# Patient Record
Sex: FEMALE | Race: WHITE | URBAN - METROPOLITAN AREA
[De-identification: names, ages, dates, MRNs, and addresses within clinical notes are randomized per-mention and may not be internally consistent; named-entity substitution may affect disease eponyms.]

---

## 2017-12-13 ENCOUNTER — TELEPHONE (OUTPATIENT)
Dept: TRANSPLANT | Facility: CLINIC | Age: 52
End: 2017-12-13

## 2017-12-13 NOTE — TELEPHONE ENCOUNTER
Yeimylane Ornelas called and stated that she is interested in becoming a living donor for her nephew, Bienvenido Cabrera.  Medical and social history obtained.  Patient reports her BMI is 33.1. Required weight loss discussed. No absolute contraindications noted.  All questions answered.  HLA kit requested.

## 2017-12-27 ENCOUNTER — TELEPHONE (OUTPATIENT)
Dept: TRANSPLANT | Facility: CLINIC | Age: 52
End: 2017-12-27

## 2017-12-27 DIAGNOSIS — Z00.5 TRANSPLANT DONOR EVALUATION: Primary | ICD-10-CM

## 2017-12-28 ENCOUNTER — LAB VISIT (OUTPATIENT)
Dept: LAB | Facility: HOSPITAL | Age: 52
End: 2017-12-28
Payer: COMMERCIAL

## 2017-12-28 DIAGNOSIS — Z00.5 TRANSPLANT DONOR EVALUATION: ICD-10-CM

## 2017-12-28 PROCEDURE — 81373 HLA I TYPING 1 LOCUS LR: CPT | Mod: 91,PO,TXP

## 2017-12-28 PROCEDURE — 81376 HLA II TYPING 1 LOCUS LR: CPT | Mod: 91,PO,TXP

## 2017-12-28 PROCEDURE — 86901 BLOOD TYPING SEROLOGIC RH(D): CPT | Mod: TXP

## 2017-12-28 PROCEDURE — 36415 COLL VENOUS BLD VENIPUNCTURE: CPT | Mod: TXP

## 2017-12-28 PROCEDURE — 81373 HLA I TYPING 1 LOCUS LR: CPT | Mod: PO,TXP

## 2017-12-28 PROCEDURE — 81376 HLA II TYPING 1 LOCUS LR: CPT | Mod: PO,TXP

## 2017-12-28 PROCEDURE — 81380 HLA I TYPING 1 LOCUS HR: CPT | Mod: PO,TXP

## 2017-12-29 LAB — ABO + RH BLD: NORMAL

## 2018-01-05 LAB — HLATY INTERPRETATION: NORMAL

## 2018-01-11 ENCOUNTER — TELEPHONE (OUTPATIENT)
Dept: TRANSPLANT | Facility: CLINIC | Age: 53
End: 2018-01-11

## 2018-01-11 DIAGNOSIS — Z00.5 TRANSPLANT DONOR EVALUATION: Primary | ICD-10-CM

## 2018-01-11 NOTE — TELEPHONE ENCOUNTER
Patient notified that the results of the crossmatch with her nephew shows that they are compatible. States she would like to proceed with the medical evaluation. Required testing discussed and information provided to schedule testing.    Patient will have an updated gyn exam and mammogram scheduled at home. She will schedule a colonoscopy locally if approved as a donor.   All questions were answered and patient verbalized understanding.

## 2018-01-16 LAB
HLA DRB4 1: NORMAL
HLA SSO DNA TYPING CLASS I & II INTERPRETATION: NORMAL
HLA-A 1 SERO. EQUIV: 29
HLA-A 1: NORMAL
HLA-A 2 SERO. EQUIV: 32
HLA-A 2: NORMAL
HLA-B 1 SERO. EQUIV: 35
HLA-B 1: NORMAL
HLA-B 2 SERO. EQUIV: 44
HLA-B 2: NORMAL
HLA-BW 1 SERO. EQUIV: 4
HLA-BW 2 SERO. EQUIV: 6
HLA-C 1: NORMAL
HLA-C 2: NORMAL
HLA-C1 HI RES: NORMAL
HLA-C2 HI RES: NORMAL
HLA-CW 1 SERO. EQUIV: 4
HLA-CW 2 SERO. EQUIV: 16
HLA-DQ 1 SERO. EQUIV: 7
HLA-DQ 2 SERO. EQUIV: 5
HLA-DQB1 1: NORMAL
HLA-DQB1 2: NORMAL
HLA-DRB1 1 SERO. EQUIV: 1
HLA-DRB1 1: NORMAL
HLA-DRB1 2 SERO. EQUIV: 13
HLA-DRB1 2: NORMAL
HLA-DRB3 1: NORMAL
HLA-DRB3 2: NORMAL
HLA-DRB345 1 SERO. EQUIV: 52
HLA-DRB345 2 SERO. EQUIV: NORMAL
HLA-DRB4 2: NORMAL
HLA-DRB5 1: NORMAL
HLA-DRB5 2: NORMAL
HRC TESTING DATE: NORMAL
Lab: NORMAL
SSDQB TESTING DATE: NORMAL
SSDRB TESTING DATE: NORMAL
SSOA TESTING DATE: NORMAL
SSOB TESTING DATE: NORMAL
SSOC TESTING DATE: NORMAL
SSODR TESTING DATE: NORMAL
TYSSO TESTING DATE: NORMAL

## 2018-03-01 ENCOUNTER — HOSPITAL ENCOUNTER (OUTPATIENT)
Dept: RADIOLOGY | Facility: HOSPITAL | Age: 53
Discharge: HOME | End: 2018-03-01
Attending: NURSE PRACTITIONER
Payer: COMMERCIAL

## 2018-03-01 ENCOUNTER — OFFICE VISIT (OUTPATIENT)
Dept: TRANSPLANT | Facility: CLINIC | Age: 53
End: 2018-03-01
Payer: COMMERCIAL

## 2018-03-01 VITALS
DIASTOLIC BLOOD PRESSURE: 86 MMHG | RESPIRATION RATE: 20 BRPM | BODY MASS INDEX: 33.71 KG/M2 | OXYGEN SATURATION: 98 % | HEIGHT: 63 IN | WEIGHT: 190.25 LBS | HEART RATE: 109 BPM | TEMPERATURE: 98 F | SYSTOLIC BLOOD PRESSURE: 130 MMHG

## 2018-03-01 DIAGNOSIS — M54.40 CHRONIC LOW BACK PAIN WITH SCIATICA, SCIATICA LATERALITY UNSPECIFIED, UNSPECIFIED BACK PAIN LATERALITY: ICD-10-CM

## 2018-03-01 DIAGNOSIS — Z00.5 TRANSPLANT DONOR EVALUATION: ICD-10-CM

## 2018-03-01 DIAGNOSIS — F32.A DEPRESSION, UNSPECIFIED DEPRESSION TYPE: ICD-10-CM

## 2018-03-01 DIAGNOSIS — Z00.5 TRANSPLANT DONOR EVALUATION: Primary | ICD-10-CM

## 2018-03-01 DIAGNOSIS — Z00.5 WILLING TO BE KIDNEY DONOR: ICD-10-CM

## 2018-03-01 DIAGNOSIS — G89.29 CHRONIC LOW BACK PAIN WITH SCIATICA, SCIATICA LATERALITY UNSPECIFIED, UNSPECIFIED BACK PAIN LATERALITY: ICD-10-CM

## 2018-03-01 DIAGNOSIS — E66.9 OBESITY (BMI 30.0-34.9): ICD-10-CM

## 2018-03-01 PROCEDURE — 78707 K FLOW/FUNCT IMAGE W/O DRUG: CPT | Mod: 26,TXP,, | Performed by: RADIOLOGY

## 2018-03-01 PROCEDURE — A9562 TC99M MERTIATIDE: HCPCS | Mod: TXP

## 2018-03-01 PROCEDURE — 99204 OFFICE O/P NEW MOD 45 MIN: CPT | Mod: S$GLB,TXP,, | Performed by: TRANSPLANT SURGERY

## 2018-03-01 PROCEDURE — 71046 X-RAY EXAM CHEST 2 VIEWS: CPT | Mod: 26,TXP,, | Performed by: RADIOLOGY

## 2018-03-01 PROCEDURE — 71046 X-RAY EXAM CHEST 2 VIEWS: CPT | Mod: TC,FY,TXP

## 2018-03-01 PROCEDURE — 99999 PR PBB SHADOW E&M-EST. PATIENT-LVL IV: CPT | Mod: PBBFAC,TXP,, | Performed by: INTERNAL MEDICINE

## 2018-03-01 PROCEDURE — 99205 OFFICE O/P NEW HI 60 MIN: CPT | Mod: S$GLB,TXP,, | Performed by: INTERNAL MEDICINE

## 2018-03-01 PROCEDURE — 97802 MEDICAL NUTRITION INDIV IN: CPT | Mod: S$GLB,TXP,, | Performed by: DIETITIAN, REGISTERED

## 2018-03-01 RX ORDER — HYDROCODONE BITARTRATE AND ACETAMINOPHEN 10; 325 MG/1; MG/1
1 TABLET ORAL EVERY 4 HOURS PRN
COMMUNITY

## 2018-03-01 RX ORDER — TEMAZEPAM 30 MG/1
30 CAPSULE ORAL NIGHTLY PRN
COMMUNITY

## 2018-03-01 RX ORDER — AMITRIPTYLINE HYDROCHLORIDE 100 MG/1
100 TABLET ORAL NIGHTLY
COMMUNITY

## 2018-03-01 RX ORDER — GABAPENTIN 300 MG/1
300 CAPSULE ORAL NIGHTLY
COMMUNITY

## 2018-03-01 RX ORDER — CYCLOBENZAPRINE HCL 10 MG
10 TABLET ORAL NIGHTLY
COMMUNITY

## 2018-03-01 NOTE — PROGRESS NOTES
Transplant Surgery Kidney Donor Evaluation     Referring Physician:     Subjective:     Chief Complaint: Linh Ornelas is a 52 y.o. year old female who presents today wishing to be evaluated as a potential living related donor for nephew.    History of Present Illness:  Linh reports being here without coercion, payment, guilt, or other alternative motives other than wanting to help someone with kidney disease.    Review of Systems   Constitutional: Negative for activity change, appetite change, chills and fever.   Respiratory: Negative for cough and shortness of breath.    Gastrointestinal: Negative for abdominal distention, constipation, diarrhea, nausea and vomiting.   Genitourinary: Negative for difficulty urinating and dyspareunia.   Musculoskeletal: Negative.    Skin: Negative for color change and rash.   Allergic/Immunologic: Negative for immunocompromised state.   Neurological: Negative for dizziness and headaches.   Psychiatric/Behavioral: Negative.        Objective:   Physical Exam   Constitutional: She is oriented to person, place, and time. She appears well-developed and well-nourished. No distress.   HENT:   Mouth/Throat: No oropharyngeal exudate.   Eyes: Pupils are equal, round, and reactive to light. No scleral icterus.   Neck: Neck supple. No thyromegaly present.   Cardiovascular: Normal rate, normal heart sounds and intact distal pulses.    No murmur heard.  Pulmonary/Chest: No respiratory distress. She has no wheezes. She has no rales.   Abdominal: Soft. Bowel sounds are normal. She exhibits no distension and no mass. There is no tenderness. There is no rebound and no guarding. No hernia.       Musculoskeletal: She exhibits no edema.   Lymphadenopathy:     She has no cervical adenopathy.   Neurological: She is alert and oriented to person, place, and time.   Skin: Skin is dry. She is not diaphoretic. No pallor.   Psychiatric: She has a normal mood and affect.   Nursing note and vitals  reviewed.    ABO type: O NEG    Diagnoses:  1. Transplant donor evaluation    2. Willing to be kidney donor    3. Chronic low back pain with sciatica, sciatica laterality unspecified, unspecified back pain laterality    4. Depression, unspecified depression type             Transplant Surgery - Candidacy   Assessment/Plan:     Donor Candidacy: Based on information available thus far, Linh is marginal candidate for living kidney donation because-  Patient with obesity with BMI just over 33 and a h/o  Multiple operations including open cholecystectomy, lap gastric bypass and an abdominoplasty. Need to review surgical risk with team    Arjun Miranda MD       Education: I discussed with the patient the requirements for donation including the compatibility of blood and tissue typing, healthy by physical examination and workup, as well as the desire to donate.  We discussed the risks related to surgery including the risks related to anesthesia, bleeding, infection, inability for surgery to be performed laparoscopically, risks of reoperation as well as the risks of death.  We discussed the length of hospitalization, return to work times, as well as follow-up post-donation.    I also discussed the slight possibility that due to problems with the recipient operation, the transplant might not be able to be completed after the organ was already removed. If such a situation should arise, the donor prefers that the organ be transplanted into a suitable third-party recipient.    I discussed the possibility that living donor sometimes encounter problems obtaining health insurance, or could have higher premium despite ongoing efforts of transplant professionals to educate insurance companies on this issue.    I discussed with Linh that donation is a voluntary activity, and reiterated it should be done willingly and for altruistic reasons only.  I reviewed that no payment should be made for donating.  I also discussed that  coersion, guilt, pressure, or feelings of obligation are not appropriate reasons to donate.  The option to withdraw at any time was emphasized.  Linh was reminded that a medical opt out can be given to protect her confidentiality, and no member of the transplant team will discuss specifics of her health or medical/social history with anyone else without permission.  The need for lifelong routine medical follow-up for optimal health, including routine health maintenance was reviewed.    Additionally, I discussed the need for our program to be able to contact living donors for UNOS reporting purposes for a minimum of 2 years.  Failure of our center to be able to provide such information could jeopardize our ability to continue to offer living donor transplants.  Linh voices understanding and agrees to this follow-up.    I discussed the UNOS requirement for centers to report donor status for a minimum of two years. Linh understands that failure to comply with requirement could have adverse consequences for our transplant program, and agrees to cooperate with all our required follow-up.    I reviewed with Linh available lab results and other diagnostics from the evaluation process.

## 2018-03-01 NOTE — PROGRESS NOTES
"DONOR TEACHING NOTE    Met with Linh Ornelas today in clinic to review living donor education.        Topics covered included:  · Kidney donation is done voluntarily and of the donor's free will.  Process can stop at any time.   · Better success rates than cadaveric donation, shorter waiting time for the recipient: less than the 3-5 year wait on the list, more time to prepare: tests/surgery can be planned  · Laboratory studies of blood and urine.  Health exams: records of GYN/pap/mammogram (females); evaluation by transplant team and a psychiatrist (if indicated); diagnostic Tests: CXR, EKG, TB skin test, 24-hour urine collection, renal scan, CT of abdomen to assess kidney anatomy, and stress test (if indicated)  · Team will review workup for approval.  Copy of the approved criteria for donation given to patient.  Surgeon will decide which kidney will be donated.   · Benefits of laparoscopic nephrectomy: less pain, shorter hospital stay, a shorter recovery period.  Risks discussed: bleeding, deep vein thrombosis, pulmonary embolus, the need for re-operation.  Your operation may be converted to an "open" procedure if the surgeon feels it is medically necessary.  · To recovery room, transfer to TSU, if space allows or semi-private room.  Louise catheter/IV, average hospital stay is 1 day.  At discharge the cost of any prescriptions is the donor's responsibility.  · Post-operative visit 4 weeks after surgery or prior to returning to work, unless a complication arises and you need to be seen sooner.  Off of work for about 3 to 6 weeks, no driving for at least the first 3 weeks.  General surgical complications: infection, allergic reaction, and anesthesia.   · Long life considerations of living with one kidney: risk of HTN and kidney failure   · Following up with PCP 6 months after donation then yearly thereafter to monitor kidney function.  This should include weight, labs, urinalysis, and a blood pressure check.  We " are required to report your progress to UNOS at 6 months, 1 year, and 2 years post-donation.     Written educational material provided. Informed consent reviewed and signed. All questions were answered and patient verbalized understanding.     Patient is a suitable candidate for living kidney donation pending test results.

## 2018-03-01 NOTE — Clinical Note
March 1, 2018                     [facility]  [address]  [facility] [address]  Phone: [phone]   Patient: Linh Ornelas   MR Number: [id number]   YOB: 1965   Date of Visit: 3/1/2018       Dear      Thank you for referring Linh Ornelas to me for evaluation. Attached you will find relevant portions of my assessment and plan of care.    If you have questions, please do not hesitate to call me. I look forward to following Linh Ornelas along with you.    Sincerely,    Jaye Gabriel MD    Enclosure    If you would like to receive this communication electronically, please contact [email or web address] or [phone] to request [employer] access.    [employer] is a tool which provides read-only access to select patient information with whom you have a relationship. Its easy to use and provides real time access to review your patients record including encounter summaries, notes, results, and demographic information.    If you feel you have received this communication in error or would no longer like to receive these types of communications, please e-mail [email or web address]

## 2018-03-01 NOTE — PROGRESS NOTES
PHARM.D. PRE-TRANSPLANT NOTE:    This patient's medication therapy was evaluated as part of her pre-transplant evaluation.    The following pharmacologic concerns were noted: Patient currently on amitriptyline (every night), cyclobenzaprine (every night), gabapentin(every night), temazepam (every night), hydrocodone(4-6 tablets per day).       Current Outpatient Prescriptions   Medication Sig Dispense Refill    amitriptyline (ELAVIL) 100 MG tablet Take 100 mg by mouth every evening.      cyclobenzaprine (FLEXERIL) 10 MG tablet Take 10 mg by mouth every evening.      gabapentin (NEURONTIN) 300 MG capsule Take 300 mg by mouth every evening.      hydrocodone-acetaminophen 10-325mg (NORCO)  mg Tab Take 1 tablet by mouth every 4 (four) hours as needed for Pain.      temazepam (RESTORIL) 30 mg capsule Take 30 mg by mouth nightly as needed for Insomnia.       No current facility-administered medications for this visit.          Currently the patient is responsible for preparing / administering this patient's medications on a daily basis.  I am available for consultation and can be contacted, as needed by the other members of the Kidney Transplant team.

## 2018-03-01 NOTE — PROGRESS NOTES
"REFERRING PROVIDER: Jaye Gabriel MD    REASON FOR VIST: pre-kidney donor work-up, wt loss education    PAST MEDICAL HX:  Past Medical History:   Diagnosis Date    Back pain     Depression     Willing to be kidney donor         LABS: Noted    Ht: 63"    Wt: 190lbs    BMI: 33.4    NUTRITION HX/INTERVENTION: Pt reports hx of gastric bypass surgery in 2005- highest wt was 260 prior to surgery, lowest wt was 135lbs.  Pt states she has been at 189-190lbs since 2009.  Physical activity limited due to back issues, unable to do heavy lifting due to rotator cuff tear-pt states she does try to remain active, does chores at home and is raising her grandson.  Pt reports she typically consumes one meal a day, does not snack often between meals, beverages includes water and Dr. Pepper.  Seldom consumes fast food, fried foods, or sweets/desserts.  -Weight loss packet reviewed (wt loss tips, label reading, portion control, healthy snacks, shopping & dining out tips, exercise  & other resources available).  -Encouraged physical activity as medically able.  -Encouraged pt to consume small meals throughout the day.  -Goal weight-170lbs for BMI<30      Patient voiced understanding of education & goals. Contact information was provided & will f/u as needed at clinic visits.     Consultation Time: 15 minutes    "

## 2018-03-01 NOTE — PATIENT INSTRUCTIONS
1. Avoid excess use of ibuprofen, naproxen, Motrin (NSAIDs), protein consumption, iodinated contrast dye  2. Stay active   3. Establish with a PCP  4. Will need to evaluate the cause of the anemia  5. You should have a colonoscopy

## 2018-03-02 ENCOUNTER — HOSPITAL ENCOUNTER (OUTPATIENT)
Dept: RADIOLOGY | Facility: HOSPITAL | Age: 53
Discharge: HOME | End: 2018-03-02
Attending: TRANSPLANT SURGERY
Payer: COMMERCIAL

## 2018-03-02 ENCOUNTER — HOSPITAL ENCOUNTER (OUTPATIENT)
Dept: RADIOLOGY | Facility: HOSPITAL | Age: 53
Discharge: HOME | End: 2018-03-02
Attending: NURSE PRACTITIONER
Payer: COMMERCIAL

## 2018-03-02 ENCOUNTER — HOSPITAL ENCOUNTER (OUTPATIENT)
Dept: CARDIOLOGY | Facility: CLINIC | Age: 53
Discharge: HOME | End: 2018-03-02
Attending: NURSE PRACTITIONER
Payer: COMMERCIAL

## 2018-03-02 DIAGNOSIS — Z00.5 TRANSPLANT DONOR EVALUATION: ICD-10-CM

## 2018-03-02 LAB
DIASTOLIC DYSFUNCTION: NO
RETIRED EF AND QEF - SEE NOTES: 60 (ref 55–65)

## 2018-03-02 PROCEDURE — 78701 KIDNEY IMAGING WITH FLOW: CPT | Mod: 26,TXP,, | Performed by: RADIOLOGY

## 2018-03-02 PROCEDURE — 25500020 PHARM REV CODE 255: Mod: TXP | Performed by: TRANSPLANT SURGERY

## 2018-03-02 PROCEDURE — 93352 ADMIN ECG CONTRAST AGENT: CPT | Mod: S$GLB,TXP,, | Performed by: INTERNAL MEDICINE

## 2018-03-02 PROCEDURE — 74174 CTA ABD&PLVS W/CONTRAST: CPT | Mod: 26,TXP,, | Performed by: RADIOLOGY

## 2018-03-02 PROCEDURE — 93351 STRESS TTE COMPLETE: CPT | Mod: S$GLB,TXP,, | Performed by: INTERNAL MEDICINE

## 2018-03-02 PROCEDURE — A9539 TC99M PENTETATE: HCPCS | Mod: TXP

## 2018-03-02 PROCEDURE — 74174 CTA ABD&PLVS W/CONTRAST: CPT | Mod: TC,TXP

## 2018-03-02 PROCEDURE — 93321 DOPPLER ECHO F-UP/LMTD STD: CPT | Mod: S$GLB,TXP,, | Performed by: INTERNAL MEDICINE

## 2018-03-02 RX ADMIN — IOHEXOL 125 ML: 350 INJECTION, SOLUTION INTRAVENOUS at 12:03

## 2018-03-02 NOTE — PROGRESS NOTES
Patient identified by 2 patient identifiers. Denies reactions to blood transfusions. Procedure explained and informed consent obtained. IV to left hand already in place from previous testing with positive blood return and flushed without difficulty. 3cc optison administered and exercise stress echo images obtained.  IV flushed and kept in place for patient's next study-ct scan. Patient tolerated well.

## 2018-03-03 NOTE — PROGRESS NOTES
TRANSPLANT DONOR PSYCHOSOCIAL ASSESSMENT    Linh Ornelas  [address]  [facility] [address]    Telephone Information:   Mobile [phone]     Home [phone] (home)  Work  There is no work phone number on file.  E-mail  [email or web address]    PHS Increased Risk Behavioral Questionnaire reviewed by : Yes   addressed any PHS increased risk behaviors or concerns. Resources and education provided as appropriate.    Sex: female  YOB: 1965  Age: 52 y.o.    Encounter Date: 3/1/2018  [place] Citizen: yes  Primary Language: English   Needed: no    Potential Recipient: Bienvenido Cabrera  Clinic Number: [id number]  Donor's Relationship to Patient: Donor is aunt    Emergency Contact:  Ev Farah, 57 yo sister, Bam DAILY, does drive/own car, works part time for [employer]. [phone]  Moe Oglesby, 54 yo , Bam DAILY, does drive/own car, self employed [employer]. [phone]    Family/Social Support:   Number of dependents/: yes, 1 legally adopted 12 yo grandson Casper Portillo; cousin Janie will assist with childcare for transplant  Marital history:  to Moe 18 years; together 28 years  Other family dynamics: Pt reports mother  and father still living. Pt reports  large and supportive family living nearby in [facility]. Pt reports lives with  Moe and legally adopted grand son Casper in [facility]. Pt reports does not work. Ev Farah with patient briefly for psychosocial session and reports will be her primary caregiver.    Household Composition:  Moe Oglesby, 54 yo , Bam DAILY, does drive/own car, self employed [employer]. [phone]  legally adopted 12 yo grandson Casper Portillo    Do you and your caregivers have access to reliable transportation? yes  PRIMARY CAREGIVER: Ev Farah, sister, will be primary caregiver, phone number [phone] .      provided in-depth information to  patient and caregiver regarding pre- and post-transplant caregiver role.   strongly encourages patient and caregiver to have concrete plan regarding post-transplant care giving, including back-up caregiver(s) to ensure care giving needs are met as needed.    Patient and Caregiver states understanding all aspects of caregiver role/commitment and is able/willing/committed to being caregiver to the fullest extent necessary.    Patient and Caregiver verbalizes understanding of the education provided today and caregiver responsibilities.         remains available. Patient and Caregiver agree to contact  in a timely manner if concerns arise.      Able to take time off work without financial concerns: yes.     Additional Significant Others who will Assist with Transplant:  Moe Oglesby, 54 yo , Bam DAILY, does drive/own car, self employed [employer]. [phone]    Living Will: no  Healthcare Power of : no  Advance Directives on file: <no information> per medical record.  Verbally reviewed LW/HCPA information.   provided patient with copy of LW/HCPA documents and provided education on completion of forms.    Education: 9th  Reading Ability: 9th grade  Reports difficulty with: N/A  Learns [employer]:  Reading about, seeing and doing new task until proficient     Status: no  VA Benefits: no     Employment:  Unemployed. Last job held was  for [employer] and quit 2009.  Fundraising and [employer] information provided to patient.  Patient and Caregiver verbalizes understanding.   remains available.    Spouse/Significant Other Employment: Jack is self employed [employer]ha    Income: $3300 and $1366 from  for Casper's care    Insurance: see potential recipient's insurance for donor coverage.    Does the donor have health insurance? BCBS.Reports [facility]    Patient verbalizes clear  understanding that patient may experience difficulty obtaining and/or be denied insurance coverages post-surgery.  This includes and is not limited to disability insurance, life insurance, health insurance, burial insurance, long term care insurance, and other insurances.  Patient also reports understanding that future health concerns related to or unrelated to transplantation may not be covered by patient's insurance.  Resources and information provided and reviewed.      Patient provides verbal permission to release any necessary information to outside resources for patient care and discharge planning.  Resources and information provided and reviewed.      Infusion Service: patient utilizing? no  Home Health: patient utilizing? no  DME: no  ADLS:  Independent with self care and medication management    Adherence:   Pt reports is highly compliant with all medical appointments and instructions.   Adherence education and counseling provided    Per History Section:  Past Medical History:   Diagnosis Date    Back pain     Depression     Willing to be kidney donor      Social History   Substance Use Topics    Smoking status: Former Smoker     Years: 4.00     Types: Cigarettes     Quit date: 1999    Smokeless tobacco: Never Used      Comment: smoked 1 pack in 2 weeks and smoked for 4 years    Alcohol use No      Comment: socially in the past     History   Drug Use No     History   Sexual Activity    Sexual activity: Not on file       Per Today's Psychosocial:  Tobacco: none, patient denies any use at this time. Pt reports plan to abstain.  Alcohol: none, patient denies any use at this time. Pt reports plant to abstain.  Illicit Drugs/Non-prescribed Medications: none, patient denies any use.    Patient states clear understanding of the potential impact of substance use as it relates to donor candidacy and is agreeable to random substance screening.  Substance abstinence/cessation counseling, education and resources  provided and reviewed.     Arrests/DWI/Treatment/Rehab: patient denies    Psychiatric History:    Mental Health: Pt reports has history of depression. Pt reports son Jasmeet  by suicide . Pt reports saw nurse practioner for sadness and was prescribed Elavil 100 mg. Pt reports Elavil does help and her pain medicine doctor Dr. Elam follows her for Elavil prescriptions; next appointment 2018. Pt reports pain medicine for back pain and right shoulder problem -- patient was encouraged to discuss in detail with transplant nephrologist information concerning her pain and what is being done for it so if a donor, then transplant medical team can coordinate care.. Pt denies any overwhelming feelings of depression or anxiety at this time. Pt reports understanding need to seek medical attention immediately is mood changes or if she believes Elivil is no longer working. Pt denies any need for mental health referral at this time.  Psychiatrist/Counselor: pt denies  Medications:  Elavil 100 mg QD at night  Suicide/Homicide Issues:  Pt denies any history of si/hi   Safety at home: pt denies any problems with safety in the home at this time.    Donation Knowledge/Expectations: Patient and Caregiver states having clear understanding and realistic expectations regarding the potential risks and potential benefits of organ transplantation and organ donation and agrees to discuss with health care team members and support system members, as well as to utilize available resources and express questions and/or concerns in order to further facilitate the patients informed decision-making.  Resources and information provided and reviewed.     Decision-making Process: Pt reports it was her idea to be worked up to provide living donation to her nephew/God son Bienvenido Cabrera. Pt reports they are a close family and she would do whatever she can to help her nephew have a healthier life. Patient states understanding that  transplant and donation are not a guarantee that the donated organ will function.  Patient states understanding of kidney treatment options available for kidney patients, psychosocial aspects surrounding organ donation and transplantation as well as recovery.  Patient also states clear understanding that patient may choose to not donate at any time prior to surgery taking place, and that patient confidentiality is protected.  Patient reports expected compliance with health care regime and states understanding of importance of compliance.  Educational information provided.    Patient reports having a clear understanding  that risks and benefits may be involved with organ transplantation and with organ donation and  of the treatment options available to a potential transplant recipient. Patient reports clear understanding that psychosocial risk factors which may affect patient, including but not limited to feelings of depression, generalized anxiety, anxiety regarding dependence on others, post traumatic stress disorder, feelings of guilt and other emotional and/or mental concerns, and/or exacerbation of existing mental health concerns.     Detailed resources and education provided and discussed. Patient agrees to access appropriate resources in a timely manner as needed and to communicate concerns appropriately.      Feelings or Concerns:  Patient denies feelings of coercion, pressure or obligation to donate. Patient states that patient is not receiving any compensation for organ donation. Patient reports motivation to pursue organ donation at this time.     Patient reports having clear and realistic expectations and understanding of the many psychosocial aspects involved with being a living organ donor, including but not limited to costs, compliance, medications, lab work, procedures, appointments, financial planning, preparedness, timely and appropriate communication of concerns, abstinence from non-prescribed  drugs or substances, importance of adherence to and follow-through with all health care team recommendations, participation in health care and treatment planning, utilization of resources and follow-through, mental health counseling as needed and/or recommended, and the patient and caregiver responsibilities.  Patient states having a clear understanding of possible difficulty obtaining or possibility of being denied insurance coverage post-surgery.  This includes and is not limited to disability insurance, life insurance, health insurance, burial insurance, long-term care insurance and other insurances.  Educational and resource information provided and reviewed.  Patient also reports understanding that future health concerns related to or unrelated to organ donation may not be covered by patients insurance.    Coping:  Pt reports is coping well over all with idea of going through surgery and providing living donation to her nephew Bienvenido Cabrera. Pt reports sabi best by riding horses and doing activities with her family.    Interview Behavior: Linh presents as alert and oriented x 4, pleasant, good eye contact, well groomed, recall good, concentration/judgement good, average intelligence, calm, communicative, cooperative and asking and answering questions appropriately.  Pt was seen alone for psychosocial except when transplant caregiver confirmed with sister Ev.    Suitability for Donation: Linh Ornelas presents as a suitable candidate for donation at this time.    Recommendations/Additional Comments: pt reports has BCBS and usually obtains out pt medicines from [facility].    Pt reports has pain management doctor, Dr. Elam and will discuss information regarding treatment with transplant medical team.    Mily Munoz MSW LCSW

## 2018-03-09 ENCOUNTER — COMMITTEE REVIEW (OUTPATIENT)
Dept: TRANSPLANT | Facility: CLINIC | Age: 53
End: 2018-03-09

## 2018-03-09 NOTE — COMMITTEE REVIEW
Linh Dominguezro was presented at selection committee on 3/9/18 . Patient did not meet selection criteria for living kidney donation due to differential function of kidneys, weight, and multiple abdominal surgeries.    Patient notified of committee decision. All questions were answered and patient verbalized understanding.     Note written by Michelle Bradley RN    ===============================================================  I was present at the meeting and attest to the decision of the committee.    Andre Sofia  03/09/2018

## 2019-02-22 ENCOUNTER — TELEPHONE (OUTPATIENT)
Dept: UROLOGY | Facility: CLINIC | Age: 54
End: 2019-02-22

## 2019-02-22 NOTE — TELEPHONE ENCOUNTER
----- Message from Lazarus Robertson sent at 2/22/2019 11:19 AM CST -----  Contact: BARBARA CRUZ [[id number]]  Name of Who is Calling: BARBARA CRUZ [[id number]]       What is the request in detail: Pt returned miss call for staff in regards to scheduling cystoscopy. Pt request a call back from nurse. Please advise. Thanks.       Can the clinic reply by [facility]: No       What Number to Call Back if not in [facility]: [phone]

## 2022-04-07 ENCOUNTER — HISTORICAL (OUTPATIENT)
Dept: ADMINISTRATIVE | Facility: HOSPITAL | Age: 57
End: 2022-04-07
Payer: COMMERCIAL

## 2022-04-24 VITALS
DIASTOLIC BLOOD PRESSURE: 89 MMHG | HEIGHT: 63 IN | SYSTOLIC BLOOD PRESSURE: 126 MMHG | BODY MASS INDEX: 37.21 KG/M2 | WEIGHT: 210 LBS